# Patient Record
(demographics unavailable — no encounter records)

---

## 2025-06-10 NOTE — DATA REVIEWED
[de-identified] : Hearing Test performed to evaluate the extent of hearing loss and  to explain pt's symptoms\par  today's hearing test was personally reviewed and revealed\par  bilat moderate hearing loss\par  fair reliability\par  abn tymps

## 2025-06-10 NOTE — PROCEDURE
[FreeTextEntry3] : Procedure - Cerumen Removal. Indication - Obstructing visualization of TM After informed verbal consent is obtained, cerumen was removed from the    left      ear canal(s) with a curette and suction.  Normal appearing canal(s) following removal.

## 2025-06-10 NOTE — HISTORY OF PRESENT ILLNESS
[T & A] : tonsillectomy & adenoidectomy [M & T] : myringotomy tube [No] : patient does not have a  history of radiation therapy [Hearing Loss] : hearing loss [Eustachian Tube Dysfunction] : eustachian tube dysfunction [None] : No associated symptoms are reported. [de-identified] : 25 yo PAtient with Trisomy 21 here with mom complains that she is not hearing well. Mom is not sure if its a lack of attention or she is not hearing well. No ear infections since last visit. She is otherwise doing okay. \par  Hx of BMT and T&A  [FreeTextEntry1] : 7/29/2024 Hearing and wax since coming home from camp no other modifying factors no other nasal or throat complaints 6/10/2025 cont to have HL Pt presents for pre-summer ear cleaning poss intersted in H  Aids no other modifying factors no other nasal or throat complaints  [Ear Fullness] : no ear fullness [Otalgia] : no otalgia [Otorrhea] : no otorrhea [Cholesteatoma] : no cholesteatoma [Early Onset Hearing Loss] : no early onset hearing loss [Stroke] : no stroke

## 2025-06-10 NOTE — PHYSICAL EXAM
[Midline] : trachea located in midline position [Normal] : no rashes [de-identified] : Left wax [de-identified] : bilat ETD  no infection

## 2025-06-10 NOTE — ASSESSMENT
[FreeTextEntry1] : wax- Rx:  Routine debridement suggested to keep the ears free of wax. f/u 9/2025 for re-eval and hearing test and poss HAE